# Patient Record
Sex: FEMALE | Race: WHITE | NOT HISPANIC OR LATINO | URBAN - METROPOLITAN AREA
[De-identification: names, ages, dates, MRNs, and addresses within clinical notes are randomized per-mention and may not be internally consistent; named-entity substitution may affect disease eponyms.]

---

## 2023-04-18 LAB
CHLAMYDIA TRACH., AMPLIFIED: NEGATIVE
N. GONORRHEA, AMPLIFIED: NEGATIVE

## 2023-04-19 LAB — URINE CULTURE: NORMAL

## 2024-02-09 RX ORDER — THYROID 15 MG/1
1 TABLET ORAL DAILY
COMMUNITY
Start: 2019-07-01 | End: 2024-02-13 | Stop reason: SDUPTHER

## 2024-02-09 RX ORDER — THYROID 15 MG/1
15 TABLET ORAL DAILY
Qty: 90 TABLET | Refills: 0 | Status: CANCELLED | OUTPATIENT
Start: 2024-02-09

## 2024-02-13 DIAGNOSIS — E03.9 HYPOTHYROIDISM, UNSPECIFIED TYPE: Primary | ICD-10-CM

## 2024-02-13 RX ORDER — THYROID 15 MG/1
15 TABLET ORAL DAILY
Qty: 90 TABLET | Refills: 3 | Status: SHIPPED | OUTPATIENT
Start: 2024-02-13 | End: 2024-02-15 | Stop reason: SDUPTHER

## 2024-02-13 RX ORDER — THYROID 60 MG/1
60 TABLET ORAL DAILY
Qty: 90 TABLET | Refills: 3 | Status: SHIPPED | OUTPATIENT
Start: 2024-02-13 | End: 2024-02-15 | Stop reason: SDUPTHER

## 2024-02-15 ENCOUNTER — OFFICE VISIT (OUTPATIENT)
Dept: PRIMARY CARE | Facility: CLINIC | Age: 39
End: 2024-02-15
Payer: COMMERCIAL

## 2024-02-15 VITALS — SYSTOLIC BLOOD PRESSURE: 120 MMHG | BODY MASS INDEX: 21.59 KG/M2 | DIASTOLIC BLOOD PRESSURE: 78 MMHG | WEIGHT: 142 LBS

## 2024-02-15 DIAGNOSIS — E03.9 HYPOTHYROIDISM, UNSPECIFIED TYPE: ICD-10-CM

## 2024-02-15 DIAGNOSIS — F41.9 ANXIETY: ICD-10-CM

## 2024-02-15 DIAGNOSIS — Z00.00 WELLNESS EXAMINATION: ICD-10-CM

## 2024-02-15 DIAGNOSIS — Z13.6 ENCOUNTER FOR SCREENING FOR CARDIOVASCULAR DISORDERS: ICD-10-CM

## 2024-02-15 DIAGNOSIS — R53.83 OTHER FATIGUE: ICD-10-CM

## 2024-02-15 DIAGNOSIS — Z13.29 SCREENING FOR THYROID DISORDER: ICD-10-CM

## 2024-02-15 DIAGNOSIS — Z13.0 SCREENING FOR DEFICIENCY ANEMIA: ICD-10-CM

## 2024-02-15 DIAGNOSIS — F90.0 ATTENTION DEFICIT HYPERACTIVITY DISORDER (ADHD), PREDOMINANTLY INATTENTIVE TYPE: Primary | ICD-10-CM

## 2024-02-15 PROBLEM — Z97.5 IUD CONTRACEPTION: Status: ACTIVE | Noted: 2024-02-15

## 2024-02-15 PROBLEM — N91.1 SECONDARY AMENORRHEA: Status: ACTIVE | Noted: 2024-02-15

## 2024-02-15 PROBLEM — R45.1 RESTLESSNESS AND AGITATION: Status: ACTIVE | Noted: 2024-02-15

## 2024-02-15 PROBLEM — M65.4 RADIAL STYLOID TENOSYNOVITIS: Status: ACTIVE | Noted: 2024-02-15

## 2024-02-15 PROBLEM — R45.86 MOOD CHANGES: Status: ACTIVE | Noted: 2024-02-15

## 2024-02-15 PROBLEM — O21.0 HYPEREMESIS GRAVIDARUM (HHS-HCC): Status: ACTIVE | Noted: 2024-02-15

## 2024-02-15 PROBLEM — E28.39 PREMATURE OVARIAN FAILURE: Status: ACTIVE | Noted: 2024-02-15

## 2024-02-15 PROBLEM — N94.10 DYSPAREUNIA IN FEMALE: Status: ACTIVE | Noted: 2024-02-15

## 2024-02-15 PROBLEM — N97.9 FEMALE INFERTILITY: Status: ACTIVE | Noted: 2024-02-15

## 2024-02-15 PROBLEM — R19.8 IRREGULAR BOWEL HABITS: Status: ACTIVE | Noted: 2024-02-15

## 2024-02-15 PROBLEM — G43.909 MIGRAINES: Status: ACTIVE | Noted: 2024-02-15

## 2024-02-15 PROBLEM — I95.1 ORTHOSTATIC HYPOTENSION: Status: ACTIVE | Noted: 2024-02-15

## 2024-02-15 PROCEDURE — 1036F TOBACCO NON-USER: CPT | Performed by: STUDENT IN AN ORGANIZED HEALTH CARE EDUCATION/TRAINING PROGRAM

## 2024-02-15 PROCEDURE — 99395 PREV VISIT EST AGE 18-39: CPT | Performed by: STUDENT IN AN ORGANIZED HEALTH CARE EDUCATION/TRAINING PROGRAM

## 2024-02-15 RX ORDER — ETONOGESTREL AND ETHINYL ESTRADIOL .015; .12 MG/D; MG/D
INSERT, EXTENDED RELEASE VAGINAL
COMMUNITY

## 2024-02-15 RX ORDER — ATOMOXETINE 80 MG/1
80 CAPSULE ORAL DAILY
COMMUNITY
Start: 2024-01-15 | End: 2024-02-15 | Stop reason: SDUPTHER

## 2024-02-15 RX ORDER — BUPROPION HYDROCHLORIDE 300 MG/1
300 TABLET ORAL EVERY MORNING
COMMUNITY
Start: 2019-10-30 | End: 2024-02-15 | Stop reason: SDUPTHER

## 2024-02-15 RX ORDER — THYROID 60 MG/1
60 TABLET ORAL DAILY
Qty: 90 TABLET | Refills: 3 | Status: SHIPPED | OUTPATIENT
Start: 2024-02-15 | End: 2025-02-14

## 2024-02-15 RX ORDER — BUPROPION HYDROCHLORIDE 300 MG/1
300 TABLET ORAL EVERY MORNING
Qty: 90 TABLET | Refills: 3 | Status: SHIPPED | OUTPATIENT
Start: 2024-02-15 | End: 2025-02-14

## 2024-02-15 RX ORDER — ATOMOXETINE 80 MG/1
80 CAPSULE ORAL DAILY
Qty: 90 CAPSULE | Refills: 3 | Status: SHIPPED | OUTPATIENT
Start: 2024-02-15 | End: 2025-02-14

## 2024-02-15 RX ORDER — CHOLECALCIFEROL (VITAMIN D3) 25 MCG
1000 TABLET ORAL
COMMUNITY

## 2024-02-15 RX ORDER — THYROID 15 MG/1
15 TABLET ORAL DAILY
Qty: 90 TABLET | Refills: 3 | Status: SHIPPED | OUTPATIENT
Start: 2024-02-15 | End: 2025-02-14

## 2024-02-15 ASSESSMENT — PAIN SCALES - GENERAL: PAINLEVEL: 0-NO PAIN

## 2024-02-15 ASSESSMENT — ENCOUNTER SYMPTOMS: DEPRESSION: 0

## 2024-02-15 NOTE — PATIENT INSTRUCTIONS
#1. Wellness visit.  No concerns on exam.     #2. Anxiety. ADHD.  Currently stable on strattera and Wellbutrin.  Med refills given today.     #3. Hypothyroidism.  Repeat labs today.  Continue current meds, refills today.

## 2024-02-15 NOTE — PROGRESS NOTES
Subjective   Patient ID: Danielle Maynard is a 38 y.o. female who presents for Annual Exam (She is not fasting.) and Med Refill.    HPI comes in for wellness    Review of Systems  Constitutional: NO F, chills, or sweats  Eyes: no blurred vision or visual disturbance  ENT: no hearing loss, no congestion, no nasal discharge, no hoarseness and no sore throat.   Cardiovascular: no chest pain, no edema, no palps and no syncope.   Respiratory: no cough,no s.o.b. and no wheezing  Gastrointestinal: no abdominal pain, No C/D no N/V, no blood in stools  Genitourinary: no dysuria, no change in urinary frequency, no urinary hesitancy and no feelings of urinary urgency.   Musculoskeletal: no arthralgias,  no back pain and no myalgias.   Integumentary: no new skin lesions and no rashes.   Neurological: no difficulty walking, no headache, no limb weakness, no numbness and no tingling.   Psychiatric: no anxiety, no depression, no anhedonia and no substance use disorders.   Endocrine: no recent weight gain and no recent weight loss.   Hematologic/Lymphatic: no tendency for easy bruising and no swollen glands.  Objective   /78 (BP Location: Left arm, Patient Position: Sitting, BP Cuff Size: Adult)   Wt 64.4 kg (142 lb)   BMI 21.59 kg/m²     Physical Exam  gen- a & o x 3, nad, pleasant  heent- eomi, perrla, ear canals patent, TM's non-erythematous, no fluid, frontal and maxillary sinus's nontender  neck- supple, nontender, no palpable or enlarged nodes, no thyromegaly  heart- rrr, no murmurs  lungs- cta b/l , no w/r/r  chest- symmetric, nontender  ab- soft, nontender, no palpable organomegaly, postive bowel sounds  ex's- no c/c/e  neuro- CNs 2-12 grossly intact, full sensation and strength in all extremities    Assessment/Plan     #1. Wellness visit.  No concerns on exam.     #2. Anxiety. ADHD.  Currently stable on strattera and Wellbutrin.  Med refills given today.     #3. Hypothyroidism.  Repeat labs today.  Continue  current meds, refills today.

## 2024-10-03 DIAGNOSIS — E03.9 HYPOTHYROIDISM, UNSPECIFIED TYPE: ICD-10-CM

## 2024-10-03 RX ORDER — THYROID 60 MG/1
60 TABLET ORAL DAILY
Qty: 90 TABLET | Refills: 0 | Status: SHIPPED | OUTPATIENT
Start: 2024-10-03 | End: 2025-10-03

## 2024-10-22 ENCOUNTER — TELEPHONE (OUTPATIENT)
Dept: PRIMARY CARE | Facility: CLINIC | Age: 39
End: 2024-10-22
Payer: COMMERCIAL

## 2024-11-01 ENCOUNTER — APPOINTMENT (OUTPATIENT)
Dept: PRIMARY CARE | Facility: CLINIC | Age: 39
End: 2024-11-01
Payer: COMMERCIAL

## 2025-01-16 ENCOUNTER — TELEMEDICINE (OUTPATIENT)
Dept: PRIMARY CARE | Facility: CLINIC | Age: 40
End: 2025-01-16
Payer: COMMERCIAL

## 2025-01-16 DIAGNOSIS — R09.3 ABNORMAL SPUTUM COLOR: Primary | ICD-10-CM

## 2025-01-16 DIAGNOSIS — R09.82 PND (POST-NASAL DRIP): ICD-10-CM

## 2025-01-16 DIAGNOSIS — Z75.8 DOES NOT HAVE PRIMARY CARE PROVIDER: ICD-10-CM

## 2025-01-16 PROCEDURE — 99214 OFFICE O/P EST MOD 30 MIN: CPT | Performed by: NURSE PRACTITIONER

## 2025-01-16 RX ORDER — FLUTICASONE PROPIONATE 50 MCG
2 SPRAY, SUSPENSION (ML) NASAL DAILY
Qty: 16 G | Refills: 5 | Status: SHIPPED | OUTPATIENT
Start: 2025-01-16 | End: 2026-01-16

## 2025-01-16 RX ORDER — AMOXICILLIN AND CLAVULANATE POTASSIUM 875; 125 MG/1; MG/1
1 TABLET, FILM COATED ORAL 2 TIMES DAILY
Qty: 14 TABLET | Refills: 0 | Status: SHIPPED | OUTPATIENT
Start: 2025-01-16 | End: 2025-01-23

## 2025-01-16 ASSESSMENT — LIFESTYLE VARIABLES: HISTORY_OF_SMOKING: I HAVE NEVER SMOKED

## 2025-01-16 NOTE — PROGRESS NOTES
Subjective   Patient ID: Danielle Maynard is a 39 y.o. female who presents for URI.  Began 12/26 with stuffy nose, pnd  No F/C through  Used DayQuil NyQuil  Some cough initially but gone  Work from home    URI       Review of Systems    Objective   Physical Exam    Assessment/Plan   {Assess/PlanSmartLinks:12413}         Janneth Moon, SARAH-CNP 01/16/25 10:32 AM

## 2025-01-20 PROBLEM — Z75.8 DOES NOT HAVE PRIMARY CARE PROVIDER: Status: ACTIVE | Noted: 2025-01-20

## 2025-01-20 ASSESSMENT — ENCOUNTER SYMPTOMS
COUGH: 0
SINUS PRESSURE: 1
SINUS PAIN: 1
HEADACHES: 0
CONSTITUTIONAL NEGATIVE: 1

## 2025-01-20 NOTE — ASSESSMENT & PLAN NOTE
Likely pnd versus LRI, will treat, agrees to follow with [facility] to establish with PCP.  Reveiwed r/b/a to med use, typical response versus symptoms that need re-evaluation.  Sudden or acute changes to go to ED.

## 2025-02-13 ENCOUNTER — APPOINTMENT (OUTPATIENT)
Dept: PRIMARY CARE | Facility: CLINIC | Age: 40
End: 2025-02-13
Payer: COMMERCIAL

## 2025-02-13 VITALS
SYSTOLIC BLOOD PRESSURE: 113 MMHG | DIASTOLIC BLOOD PRESSURE: 74 MMHG | BODY MASS INDEX: 21.98 KG/M2 | HEART RATE: 75 BPM | HEIGHT: 68 IN | OXYGEN SATURATION: 98 % | WEIGHT: 145 LBS

## 2025-02-13 DIAGNOSIS — E03.9 HYPOTHYROIDISM, UNSPECIFIED TYPE: ICD-10-CM

## 2025-02-13 DIAGNOSIS — Z75.8 DOES NOT HAVE PRIMARY CARE PROVIDER: ICD-10-CM

## 2025-02-13 DIAGNOSIS — Z00.00 ANNUAL PHYSICAL EXAM: Primary | ICD-10-CM

## 2025-02-13 PROCEDURE — 3008F BODY MASS INDEX DOCD: CPT | Performed by: INTERNAL MEDICINE

## 2025-02-13 PROCEDURE — 99395 PREV VISIT EST AGE 18-39: CPT | Performed by: INTERNAL MEDICINE

## 2025-02-13 PROCEDURE — 1036F TOBACCO NON-USER: CPT | Performed by: INTERNAL MEDICINE

## 2025-02-13 ASSESSMENT — PATIENT HEALTH QUESTIONNAIRE - PHQ9
2. FEELING DOWN, DEPRESSED OR HOPELESS: NOT AT ALL
SUM OF ALL RESPONSES TO PHQ9 QUESTIONS 1 AND 2: 0
1. LITTLE INTEREST OR PLEASURE IN DOING THINGS: NOT AT ALL

## 2025-02-13 NOTE — PROGRESS NOTES
Subjective   Patient ID: Danielle Maynard is a 39 y.o. female who presents for [facility]  HPI    Patient is here for annual exam  Does not have any new concerns today  sees dentist  regularly.    Consumes healthy diet    does regular exercise  pregnancy [id number],ectopic 1  No bladder symptoms  Cervical cancer screen current normal   No history of abnormal Pap  Mammogram due   Colonoscopy due at 45  Medical conditions:stable  Vaccines:uptodate    Review of Systems  General: No significant change in weight, no fatigue, no fever, chills, or night sweats.  HEENT: No headache, dizziness, syncope. No blurred vision, double vision. No hearing loss, or ringing. No nasal discharge, sinus problems. No loose teeth, sore throat, hoarseness or neck stiffness.   Breast: No pain or discharge. No mass felt.   Respiratory: No cough, mucous in throat, hemoptysis, wheezing, or shortness of breath. No snoring.  Cardiac: No chest pain, palpitations, orthopnea. No leg swelling or claudication pain.   Gastrointestinal: No indigestion, heartburn, nausea, vomiting, diarrhea, constipation, rectal bleeding or hemorrhoids.  Genitourinary: No UTI symptoms, stones, incontinence.  OBGYN: No pelvic pain or bloating.some spotting recently  Endocrine: No excess thirst or urination.  Hematopoietic: No anemia, easy bruising or bleeding. No history of blood transfusion.  Neuro: No localized weakness, numbness or tingling. No tremor, history of seizure. No history of memory problems.  Psychological: No anxiety, depression or sleep disturbance.    HISTORY  Social History     Socioeconomic History    Marital status:      Spouse name: Not on file    Number of children: Not on file    Years of education: Not on file    Highest education level: Not on file   Occupational History    Not on file   Tobacco Use    Smoking status: Never    Smokeless tobacco: Never   Substance and Sexual Activity    Alcohol use: Never    Drug use:  Never    Sexual activity: Not on file   Other Topics Concern    Not on file   Social History Narrative    Not on file     Social Drivers of Health     Financial Resource Strain: Not on file   Food Insecurity: Not on file   Transportation Needs: No Transportation Needs (11/22/2023)    Received from [employer]    PRAPARE - Transportation     Lack of Transportation (Medical): No     Lack of Transportation (Non-Medical): No   Physical Activity: Not on file   Stress: Not on file   Social Connections: Not on file   Intimate Partner Violence: Not At Risk (11/22/2023)    Received from [employer]    Humiliation, Afraid, Rape, and Kick questionnaire     Fear of Current or Ex-Partner: No     Emotionally Abused: No     Physically Abused: No     Sexually Abused: No   Housing Stability: Low Risk  (11/22/2023)    Received from [employer]    Housing Stability Vital Sign     Unable to Pay for Housing in the Last Year: No     Number of Places Lived in the Last Year: 1     Unstable Housing in the Last Year: No     Family History   Problem Relation Name Age of Onset    Lung cancer Mother      Coronary artery disease Father      Lupus Sister       Past Medical History:   Diagnosis Date    Encounter for fertility testing 07/19/2017    Fertility testing    Encounter for other procreative management 04/01/2017    Encounter for artificial insemination    Encounter for preprocedural laboratory examination 07/03/2017    Encounter for preprocedural laboratory examination    Encounter for screening for infections with a predominantly sexual mode of transmission 06/20/2017    Screening for STD (sexually transmitted disease)    Encounter for screening for other viral diseases 06/20/2017    Screening for viral disease    Endometriosis, unspecified 01/29/2018    Endometriosis    Muscle weakness (generalized) 09/09/2015    Muscle weakness (generalized)    Other conditions influencing health status  "01/29/2018    History of pregnancy    Other conditions influencing health status 09/11/2017    History of pregnancy    Other specified abnormal findings of blood chemistry 02/15/2018    Abnormal thyroid blood test    Other specified health status 09/11/2017    Conceived by in vitro fertilization    Pain in right thigh 07/29/2016    Pain of right thigh    Personal history of other complications of pregnancy, childbirth and the puerperium 09/06/2017    History of hyperemesis gravidarum    Personal history of other diseases of the female genital tract 07/21/2017    History of female infertility    Pregnancy related conditions, unspecified, first trimester ([facility]) 05/01/2017    Abnormal pregnancy in first trimester    Pregnancy with inconclusive fetal viability, not applicable or unspecified 08/23/2017    Encounter to determine fetal viability of pregnancy    Radial styloid tenosynovitis (de quervain) 05/13/2019    Radial styloid tenosynovitis of both hands    Vomiting of pregnancy, unspecified     Nausea/vomiting in pregnancy     Past Surgical History:   Procedure Laterality Date    CT ANGIO NECK  8/8/2018    CT NECK ANGIO W AND WO IV CONTRAST 8/8/2018 [facility] EMERGENCY LEGAC[facility]    OTHER SURGICAL HISTORY  01/29/2018    Laparosc Excis Uterine Surf Endometriotic Tissue Posterior     @VACCINES  Objective   /74   Pulse 75   Ht 1.727 m (5' 8\")   Wt 65.8 kg (145 lb)   SpO2 98%   BMI 22.05 kg/m²      Lab Results   Component Value Date    WBC 7.1 02/02/2023    HGB 13.4 02/02/2023    HCT 41.0 02/02/2023    MCV 93 02/02/2023     02/02/2023   PAP@MAMMOGRAM  Physical Exam  In general, well-appearing, not in acute distress, alert and oriented.  HEENT: No pallor or icterus,pearla  Neck: No lymphadenopathy, no stiffness.  Supple.  .No JVD.no goiter  Chest: Clear to auscultation, good air entry.  CVS: S1 and S2 regular.  No murmur, no gallop, S3 or S4.  No peripheral edema.  No carotid bruit.  Abdomen: Soft, no " tenderness, no hepatosplenomegaly.  Extremities: No calf tenderness.  No peripheral edema.   Neuro: No focal deficits.  Psych: Mood and affect normal.  Good judgment and insight   Assessment/Plan   Problem List Items Addressed This Visit       Hypothyroidism    Relevant Orders    TSH with reflex to Free T4 if abnormal    Does not have primary care provider    Annual physical exam - Primary    Relevant Orders    CBC and Auto Differential    Comprehensive Metabolic Panel    Lipid Panel     Preventive exam and counseling completed  Up-to-date with GYN care  Check thyroid function and make changes if needed  Routine labs ordered  Planning for another pregnancy  Will discuss medication management with dental health provider follow-up in 1 year and as needed

## 2025-02-18 DIAGNOSIS — E03.9 HYPOTHYROIDISM, UNSPECIFIED TYPE: ICD-10-CM

## 2025-02-18 RX ORDER — THYROID 60 MG/1
60 TABLET ORAL DAILY
Qty: 90 TABLET | Refills: 0 | Status: SHIPPED | OUTPATIENT
Start: 2025-02-18 | End: 2026-02-18

## 2025-02-18 RX ORDER — THYROID 15 MG/1
15 TABLET ORAL DAILY
Qty: 90 TABLET | Refills: 3 | Status: SHIPPED | OUTPATIENT
Start: 2025-02-18 | End: 2026-02-18

## 2025-03-06 ENCOUNTER — APPOINTMENT (OUTPATIENT)
Dept: OBSTETRICS AND GYNECOLOGY | Facility: CLINIC | Age: 40
End: 2025-03-06
Payer: COMMERCIAL

## 2025-04-01 ENCOUNTER — OFFICE VISIT (OUTPATIENT)
Facility: CLINIC | Age: 40
End: 2025-04-01
Payer: COMMERCIAL

## 2025-04-01 VITALS — SYSTOLIC BLOOD PRESSURE: 114 MMHG | WEIGHT: 147 LBS | BODY MASS INDEX: 22.35 KG/M2 | DIASTOLIC BLOOD PRESSURE: 78 MMHG

## 2025-04-01 DIAGNOSIS — O03.9 COMPLETE OR UNSPECIFIED SPONTANEOUS ABORTION WITHOUT COMPLICATION: Primary | ICD-10-CM

## 2025-04-01 PROCEDURE — 99214 OFFICE O/P EST MOD 30 MIN: CPT | Performed by: OBSTETRICS & GYNECOLOGY

## 2025-04-01 NOTE — PROGRESS NOTES
Danielle Maynard is a 39 y.o. female who presents with a chief complaint of Follow-up (Follow up mab)  SUBJECTIVE  This is a 39-year-old  4 para 2-0-2-2 who comes today as a new patient.  She had seen Dr. Smith in the past but was unavailable in this office currently.  She had a last menses of February 3, 2025.    She then had heavy bleeding and presented to the ED at [facility] on [facility]25.  She does report a history of ectopic that required surgical management.    An IUP was not visualized, no evidence of ectopic by ultrasound.  The hCG had dropped from 4948 to 896.  She comes today for follow-up.  She states her bleeding stopped today, she was spotting lightly yesterday.  No fevers or chills, no dysuria, no change in bowel habits.   No pelvic pain.    There is clear vaginal discharge, no odor, no itching.    Past Medical History:   Diagnosis Date    Encounter for fertility testing 2017    Fertility testing    Encounter for other procreative management 2017    Encounter for artificial insemination    Encounter for preprocedural laboratory examination 2017    Encounter for preprocedural laboratory examination    Encounter for screening for infections with a predominantly sexual mode of transmission 2017    Screening for STD (sexually transmitted disease)    Encounter for screening for other viral diseases 2017    Screening for viral disease    Endometriosis, unspecified 2018    Endometriosis    Muscle weakness (generalized) 2015    Muscle weakness (generalized)    Other conditions influencing health status 2018    History of pregnancy    Other conditions influencing health status 2017    History of pregnancy    Other specified abnormal findings of blood chemistry 02/15/2018    Abnormal thyroid blood test    Other specified health status 2017    Conceived by in vitro fertilization    Pain in right thigh 2016    Pain of right thigh     Personal history of other complications of pregnancy, childbirth and the puerperium 09/06/2017    History of hyperemesis gravidarum    Personal history of other diseases of the female genital tract 07/21/2017    History of female infertility    Pregnancy related conditions, unspecified, first trimester ([facility]) 05/01/2017    Abnormal pregnancy in first trimester    Pregnancy with inconclusive fetal viability, not applicable or unspecified 08/23/2017    Encounter to determine fetal viability of pregnancy    Radial styloid tenosynovitis (de quervain) 05/13/2019    Radial styloid tenosynovitis of both hands    Vomiting of pregnancy, unspecified     Nausea/vomiting in pregnancy     Past Surgical History:   Procedure Laterality Date    CT ANGIO NECK  8/8/2018    CT NECK ANGIO W AND WO IV CONTRAST 8/8/2018 [facility] EMERGENCY LEGACY    OTHER SURGICAL HISTORY  01/29/2018    Laparosc Excis Uterine Surf Endometriotic Tissue Posterior     Social History     Socioeconomic History    Marital status:    Tobacco Use    Smoking status: Never    Smokeless tobacco: Never   Substance and Sexual Activity    Alcohol use: Never    Drug use: Never     Social Drivers of Health     Transportation Needs: No Transportation Needs (11/22/2023)    Received from [employer]    PRAPARE - Transportation     Lack of Transportation (Medical): No     Lack of Transportation (Non-Medical): No   Intimate Partner Violence: Not At Risk (11/22/2023)    Received from [employer]    Humiliation, Afraid, Rape, and Kick questionnaire     Fear of Current or Ex-Partner: No     Emotionally Abused: No     Physically Abused: No     Sexually Abused: No   Housing Stability: Low Risk  (11/22/2023)    Received from [employer]    Housing Stability Vital Sign     Unable to Pay for Housing in the Last Year: No     Number of Places Lived in the Last Year: 1     Unstable Housing in the Last Year: No     Family History    Problem Relation Name Age of Onset    Lung cancer Mother      Coronary artery disease Father      Lupus Sister         OB History   No obstetric history on file.       OBJECTIVE  Allergies   Allergen Reactions    Aspirin Other     ? allergy does not take due to stomach ulcers     Other reaction(s): Abdominal pain   ? allergy does not take due to stomach ulcers    Cortisone Unknown     makes her anxious    Ibuprofen GI Upset     GI upset    stomach ulcer    Other reaction(s): GI Upset   stomach ulcer    Naproxen GI Upset     stomach ulcer    Other reaction(s): GI Upset   stomach ulcer    Nsaids (Non-Steroidal Anti-Inflammatory Drug) Other      (Not in a hospital admission)       Review of Systems  Negative except recent SAB.  Physical Exam  General Appearance: awake, alert, oriented, in no acute distress and well developed, well nourished  She is sitting comfortably on the table in no distress.  The external genitalia appear normal.  No lesions.  On speculum exam there is no active bleeding, there is clear mucousy discharge noted.  Os is closed.  On bimanual exam nontender, no masses.  /78   Wt 66.7 kg (147 lb)   BMI 22.35 kg/m²    Problem List Items Addressed This Visit    None  This is a 39-year-old female with exam consistent with complete first trimester miscarriage.  She is O+ blood type.  No evidence of infection  or retained tissues on exam or with symptoms.    She does request a CBC to evaluate for anemia, I also ordered an hCG titer and a TSH.   We discussed returning to Dr. Smith for her annual exam and Pap smear.

## 2025-04-03 ENCOUNTER — APPOINTMENT (OUTPATIENT)
Facility: CLINIC | Age: 40
End: 2025-04-03
Payer: COMMERCIAL

## 2025-05-15 ENCOUNTER — LAB (OUTPATIENT)
Dept: LAB | Facility: HOSPITAL | Age: 40
End: 2025-05-15
Payer: COMMERCIAL

## 2025-05-15 DIAGNOSIS — O03.9 COMPLETE OR UNSPECIFIED SPONTANEOUS ABORTION WITHOUT COMPLICATION: Primary | ICD-10-CM

## 2025-05-15 LAB
ALBUMIN SERPL-MCNC: 4.7 G/DL (ref 3.6–5.1)
ALP SERPL-CCNC: 47 U/L (ref 31–125)
ALT SERPL-CCNC: 16 U/L (ref 6–29)
ANION GAP SERPL CALCULATED.4IONS-SCNC: 7 MMOL/L (CALC) (ref 7–17)
AST SERPL-CCNC: 13 U/L (ref 10–30)
B-HCG SERPL-ACNC: <5 MIU/ML
BASOPHILS # BLD AUTO: 53 CELLS/UL (ref 0–200)
BASOPHILS NFR BLD AUTO: 0.9 %
BILIRUB SERPL-MCNC: 0.7 MG/DL (ref 0.2–1.2)
BUN SERPL-MCNC: 22 MG/DL (ref 7–25)
CALCIUM SERPL-MCNC: 9.7 MG/DL (ref 8.6–10.2)
CHLORIDE SERPL-SCNC: 105 MMOL/L (ref 98–110)
CHOLEST SERPL-MCNC: 129 MG/DL
CHOLEST/HDLC SERPL: 2.2 (CALC)
CO2 SERPL-SCNC: 26 MMOL/L (ref 20–32)
CREAT SERPL-MCNC: 0.76 MG/DL (ref 0.5–0.99)
EGFRCR SERPLBLD CKD-EPI 2021: 102 ML/MIN/1.73M2
EOSINOPHIL # BLD AUTO: 142 CELLS/UL (ref 15–500)
EOSINOPHIL NFR BLD AUTO: 2.4 %
ERYTHROCYTE [DISTWIDTH] IN BLOOD BY AUTOMATED COUNT: 12.1 % (ref 11–15)
ERYTHROCYTE [DISTWIDTH] IN BLOOD BY AUTOMATED COUNT: 12.7 % (ref 11.5–14.5)
GLUCOSE SERPL-MCNC: 83 MG/DL (ref 65–99)
HCT VFR BLD AUTO: 43.4 % (ref 35–45)
HCT VFR BLD AUTO: 44.3 % (ref 36–46)
HDLC SERPL-MCNC: 58 MG/DL
HGB BLD-MCNC: 13.8 G/DL (ref 12–16)
HGB BLD-MCNC: 14 G/DL (ref 11.7–15.5)
LDLC SERPL CALC-MCNC: 56 MG/DL (CALC)
LYMPHOCYTES # BLD AUTO: 2018 CELLS/UL (ref 850–3900)
LYMPHOCYTES NFR BLD AUTO: 34.2 %
MCH RBC QN AUTO: 29.4 PG (ref 26–34)
MCH RBC QN AUTO: 30.2 PG (ref 27–33)
MCHC RBC AUTO-ENTMCNC: 31.2 G/DL (ref 32–36)
MCHC RBC AUTO-ENTMCNC: 32.3 G/DL (ref 32–36)
MCV RBC AUTO: 93.5 FL (ref 80–100)
MCV RBC AUTO: 94 FL (ref 80–100)
MONOCYTES # BLD AUTO: 584 CELLS/UL (ref 200–950)
MONOCYTES NFR BLD AUTO: 9.9 %
NEUTROPHILS # BLD AUTO: 3103 CELLS/UL (ref 1500–7800)
NEUTROPHILS NFR BLD AUTO: 52.6 %
NONHDLC SERPL-MCNC: 71 MG/DL (CALC)
NRBC BLD-RTO: 0 /100 WBCS (ref 0–0)
PLATELET # BLD AUTO: 239 THOUSAND/UL (ref 140–400)
PLATELET # BLD AUTO: 252 X10*3/UL (ref 150–450)
PMV BLD REES-ECKER: 10.5 FL (ref 7.5–12.5)
POTASSIUM SERPL-SCNC: 4.8 MMOL/L (ref 3.5–5.3)
PROT SERPL-MCNC: 7.2 G/DL (ref 6.1–8.1)
RBC # BLD AUTO: 4.64 MILLION/UL (ref 3.8–5.1)
RBC # BLD AUTO: 4.7 X10*6/UL (ref 4–5.2)
SODIUM SERPL-SCNC: 138 MMOL/L (ref 135–146)
TRIGL SERPL-MCNC: 71 MG/DL
TSH SERPL-ACNC: 2.03 MIU/L
TSH SERPL-ACNC: 2.06 MIU/L
WBC # BLD AUTO: 5.8 X10*3/UL (ref 4.4–11.3)
WBC # BLD AUTO: 5.9 THOUSAND/UL (ref 3.8–10.8)

## 2025-05-15 PROCEDURE — 85027 COMPLETE CBC AUTOMATED: CPT

## 2025-05-16 ENCOUNTER — TELEPHONE (OUTPATIENT)
Dept: PRIMARY CARE | Facility: CLINIC | Age: 40
End: 2025-05-16
Payer: COMMERCIAL

## 2025-05-16 DIAGNOSIS — K58.9 IRRITABLE BOWEL SYNDROME, UNSPECIFIED TYPE: ICD-10-CM

## 2025-05-16 DIAGNOSIS — R53.83 OTHER FATIGUE: Primary | ICD-10-CM

## 2025-05-16 NOTE — TELEPHONE ENCOUNTER
Called [facility] to add on celiac panel. Was informed that they are unable to complete add on. They need a transport tube which they do not have.

## 2025-05-20 LAB
GLIADIN IGA SER IA-ACNC: <1 U/ML
GLIADIN IGG SER IA-ACNC: <1 U/ML
IGA SERPL-MCNC: 145 MG/DL (ref 47–310)
TTG IGA SER-ACNC: <1 U/ML
TTG IGG SER-ACNC: <1 U/ML

## 2025-06-17 DIAGNOSIS — E03.9 HYPOTHYROIDISM, UNSPECIFIED TYPE: ICD-10-CM

## 2025-06-17 RX ORDER — THYROID 60 MG/1
60 TABLET ORAL DAILY
Qty: 90 TABLET | Refills: 3 | Status: SHIPPED | OUTPATIENT
Start: 2025-06-17 | End: 2026-06-17